# Patient Record
Sex: MALE | Race: WHITE | Employment: STUDENT | URBAN - METROPOLITAN AREA
[De-identification: names, ages, dates, MRNs, and addresses within clinical notes are randomized per-mention and may not be internally consistent; named-entity substitution may affect disease eponyms.]

---
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
## 2022-03-18 PROBLEM — R62.52 SHORT STATURE (CHILD): Status: ACTIVE | Noted: 2017-10-17

## 2022-03-19 PROBLEM — E23.0 GROWTH HORMONE DEFICIENCY (HCC): Status: ACTIVE | Noted: 2018-05-14

## 2022-03-19 PROBLEM — E34.31 CONSTITUTIONAL DELAY OF GROWTH AND DEVELOPMENT: Status: ACTIVE | Noted: 2017-10-17

## 2022-11-22 NOTE — LETTER
8/17/2018 4:07 PM 
 
Patient:  Yareli James YOB: 2000 Date of Visit: 8/17/2018 Dear Jerrold Simmonds, MD 
[address] [id number] VIA Facsimile: [phone] 
 : 
 
 
Thank you for referring Mr. Brenton Meier to me for evaluation/treatment. Below are the relevant portions of my assessment and plan of care. Chief Complaint Patient presents with  Follow-up Growth Subjective:  
F/U: Short stature History of present illness: 
Solo Mccray is a 14  y.o. 0  m.o. male who has been followed in endocrine clinic since 10/17/2017 for short stature. He was present today with his parents. Family have been concerned about his slow growth and lack of pubertal development for a number of years; since 7th grade. Initially they thought she would take after parents and brother who both started puberty late. However they became concerned when he hit 17years  Without much change in height or pubertal development. Bone age xray done by PMD on 9/13/17 at [place] of 17yrs was read as 15yrs 6months. Referred to [facility] for further evaluation.;  
Denies headache,tiredness, problems with peripheral vision,abdominal pain,constipation/diarrhea,heat/cold intolerance,polyuria,polydipsia. Labs done at in 10/2017 were significant for normal thyroid studies,normal growth hormone screening lans, normal CBC with H/H of 14.4/41.5,normal CMP, normal ESR. He also had LH consistent with onset of puberty. On account of slow growth velocity he had 2 agent GH stim test on 5/2/2018 with peak of 9.9ng/ml(failed). Also had a normal brain MRI on 5/29/2018. His last visit in endocrine clinic was on 6/6/2018. Since then, he has been in good health, with no significant illnesses. Started [facility] therapy in 6/2018. reports no headache, tiredness, constipation, hip pain, N/V. Past Medical History:  
Diagnosis Date  ADD (attention deficit disorder) 2015 Social History: Sofia Alvarado is in 11th grade. Activities: soccer Review of Systems: A comprehensive review of systems was negative except for that written in the HPI. Medications: 
Current Outpatient Prescriptions Medication Sig  somatropin (HUMATROPE INJECTION) 2.3 mg by Injection route daily.  montelukast (SINGULAIR) 10 mg tablet Take 10 mg by mouth as needed.  Omega-3 Fatty Acids 300 mg cap Take  by mouth.  albuterol (PROVENTIL, VENTOLIN) 90 mcg/actuation inhaler Take 2 Puffs by inhalation every six (6) hours as needed. No current facility-administered medications for this visit. Allergies: Allergies Allergen Reactions  Milk Cough Objective:  
 
 
Visit Vitals  /70 (BP 1 Location: Left arm, BP Patient Position: Sitting)  Pulse 86  Temp 97.6 °F (36.4 °C) (Oral)  Ht 5' 3.39\" (1.61 m)  Wt 120 lb 6.4 oz (54.6 kg)  SpO2 96%  BMI 21.07 kg/m2 Height: 5 %ile (Z= -1.61) based on CDC 2-20 Years stature-for-age data using vitals from 8/17/2018. Weight: 25 %ile (Z= -0.67) based on CDC 2-20 Years weight-for-age data using vitals from 8/17/2018. BMI: Body mass index is 21.07 kg/(m^2). Percentile: 57 %ile (Z= 0.19) based on CDC 2-20 Years BMI-for-age data using vitals from 8/17/2018. Change in height:+1.5cm in 2months     GV: 7.4cm/year Change in weight: relatively unchanged In general, Sofia Alvarado is alert, well-appearing and in no acute distress. HEENT: normocephalic, atraumatic. Pupils are equal, round and reactive to light. Extraocular movements are intact, fundi are sharp bilaterally. Dentition is appropriate for age. Oropharynx is clear, mucous membranes moist. Neck is supple without lymphadenopathy. Thyroid is smooth and not enlarged. Chest: Clear to auscultation bilaterally. CV: Normal S1/S2 without murmur. Abdomen is soft, nontender, nondistended, no hepatosplenomegaly. Skin is warm, without rash or macules.  Extremities are within normal. Neuro demonstrates 2+ patellar reflexes bilaterally. Sexual development: stage chidi 3 testes and PH Laboratory data: 
Results for orders placed or performed during the hospital encounter of 05/02/18 CORTISOL Result Value Ref Range Cortisol, random 6.8 ug/dL GROWTH HORMONE Result Value Ref Range Growth hormone 0.3 0.0 - 10.0 ng/mL GROWTH HORMONE Result Value Ref Range Growth hormone 9.9 0.0 - 10.0 ng/mL GROWTH HORMONE Result Value Ref Range Growth hormone 4.9 0.0 - 10.0 ng/mL GROWTH HORMONE Result Value Ref Range Growth hormone 1.5 0.0 - 10.0 ng/mL GROWTH HORMONE Result Value Ref Range Growth hormone 0.8 0.0 - 10.0 ng/mL GROWTH HORMONE Result Value Ref Range Growth hormone 8.1 0.0 - 10.0 ng/mL GROWTH HORMONE Result Value Ref Range Growth hormone 5.4 0.0 - 10.0 ng/mL Bone age: Date: 9/13/2017. AT [facility] of 17yrs 1mon bone age was 15yrs 6months Assessment:  
 
 
Joselin Nam is a 14  y.o. 0  m.o. male presenting for follow up of GHD. Started [facility] in 6/2018Good interval growth. Continue with current dose of humatrope 2.3mg daily(0.29mg/kg/week). Improve caloric intake to maximize height potentialFollow up in 4monhs. Plan:  
Reviewed growth charts with family Diagnosis, etiology, pathophysiology, risk/ benefits of rx, proposed eval, and expected follow up discussed with family and all questions answered Patient Instructions Seen for follow up for GHD Plan: 
Continue humatrope 2.3mg daily Reviewed the side effects of [facility] treatment including: pseudotumor cerebri (benign intracranial hypertension); SCFE; hypothyroidism. They will contact me for concerns over head ache or leg pain. Follow up in 4months or sooner if any concerns Total time: 30minutes Time spent counseling patient/family: 50% If you have questions, please do not hesitate to call me. I look forward to following Mr. Zion Courtney along with you. Sincerely, 
 
 
Bhargavi Siddiqui MD 
 
 Cosentyx Counseling:  I discussed with the patient the risks of Cosentyx including but not limited to worsening of Crohn's disease, immunosuppression, allergic reactions and infections.  The patient understands that monitoring is required including a PPD at baseline and must alert us or the primary physician if symptoms of infection or other concerning signs are noted.

## 2023-05-10 RX ORDER — ALBUTEROL SULFATE 90 UG/1
2 AEROSOL, METERED RESPIRATORY (INHALATION) EVERY 6 HOURS PRN
COMMUNITY

## 2023-05-10 RX ORDER — MONTELUKAST SODIUM 10 MG/1
TABLET ORAL PRN
COMMUNITY

## 2023-05-10 RX ORDER — DICYCLOMINE HYDROCHLORIDE 10 MG/1
CAPSULE ORAL
COMMUNITY
Start: 2020-12-29

## 2023-05-10 RX ORDER — PANTOPRAZOLE SODIUM 40 MG/1
1 TABLET, DELAYED RELEASE ORAL EVERY MORNING
COMMUNITY
Start: 2020-12-29

## 2025-01-07 NOTE — LETTER
4/16/2018 9:43 PM 
 
Patient:  Emeli Vargas YOB: 2000 Date of Visit: 4/16/2018 Dear Torres Porras MD 
[address] [id number] VIA Facsimile: [phone] 
 : 
 
 
Thank you for referring Mr. Cat Sung to me for evaluation/treatment. Below are the relevant portions of my assessment and plan of care. Subjective:  
F/U: Short stature History of present illness: 
Dahlia Bowser is a 15  y.o. 6  m.o. male who has been followed in endocrine clinic since 10/17/2017 for short stature. He was present today with his parents. Family have been concerned about his slow growth and lack of pubertal development for a number of years; since 7th grade. Initially they thought she would take after parents and brother who both started puberty late. However they became concerned when he hit 17years  Without much change in height or pubertal development. Bone age xray done by PMD on 9/13/17 at [place] of 17yrs was read as 15yrs 6months. Referred to [facility] for further evaluation.;  
Denies headache,tiredness, problems with peripheral vision,abdominal pain,constipation/diarrhea,heat/cold intolerance,polyuria,polydipsia. His last visit in endocrine clinic was on 10/17/2017. Since then, he has been in good health, with no significant illnesses. Labs done at last clinic visit were significant for normal thyroid studies,normal growth hormone screening lans, normal CBC with H/H of 14.4/41.5,normal CMP, normal ESR. He is here today for follow up. Past Medical History:  
Diagnosis Date  ADD (attention deficit disorder) 2015 Social History: 
Dahlia Bowser is in 10th grade. Activities: soccer Review of Systems: A comprehensive review of systems was negative except for that written in the HPI. Medications: 
Current Outpatient Prescriptions Medication Sig  
 montelukast (SINGULAIR) 10 mg tablet Take 10 mg by mouth as needed. Balloon inserted to right coronary artery.  Omega-3 Fatty Acids 300 mg cap Take  by mouth.  albuterol (PROVENTIL, VENTOLIN) 90 mcg/actuation inhaler Take 2 Puffs by inhalation every six (6) hours as needed.  fluticasone (FLOVENT HFA) 44 mcg/actuation inhaler Take 2 Puffs by inhalation two (2) times a day.  fexofenadine (CHILDREN'S ALLEGRA ALLERGY) 30 mg tablet Take 30 mg by mouth two (2) times a day. No current facility-administered medications for this visit. Allergies: Allergies Allergen Reactions  Milk Cough Objective:  
 
 
Visit Vitals  /69 (BP 1 Location: Left arm, BP Patient Position: Sitting)  Pulse 62  Temp 98.3 °F (36.8 °C) (Oral)  Resp 18  Ht 5' 2.4\" (1.585 m)  Wt 119 lb 6.4 oz (54.2 kg)  SpO2 99%  BMI 21.56 kg/m2 Height: 4 %ile (Z= -1.77) based on CDC 2-20 Years stature-for-age data using vitals from 4/16/2018. Weight: 29 %ile (Z= -0.56) based on CDC 2-20 Years weight-for-age data using vitals from 4/16/2018. BMI: Body mass index is 21.56 kg/(m^2). Percentile: 66 %ile (Z= 0.42) based on CDC 2-20 Years BMI-for-age data using vitals from 4/16/2018. Change in height:+3.3cm in 6months     GV: 6.6cm/year Change in weight: +1.9kg in 6months In general, Junior Baig is alert, well-appearing and in no acute distress. HEENT: normocephalic, atraumatic. Pupils are equal, round and reactive to light. Extraocular movements are intact, fundi are sharp bilaterally. Dentition is appropriate for age. Oropharynx is clear, mucous membranes moist. Neck is supple without lymphadenopathy. Thyroid is smooth and not enlarged. Chest: Clear to auscultation bilaterally. CV: Normal S1/S2 without murmur. Abdomen is soft, nontender, nondistended, no hepatosplenomegaly. Skin is warm, without rash or macules. Extremities are within normal. Neuro demonstrates 2+ patellar reflexes bilaterally. Sexual development: stage chidi 3 testes and PH Laboratory data: Results for orders placed or performed in visit on 10/17/17 CBC WITH AUTOMATED DIFF Result Value Ref Range WBC 8.6 3.4 - 10.8 x10E3/uL  
 RBC 4.82 4.14 - 5.80 x10E6/uL HGB 14.4 12.6 - 17.7 g/dL HCT 41.5 37.5 - 51.0 % MCV 86 79 - 97 fL  
 MCH 29.9 26.6 - 33.0 pg  
 MCHC 34.7 31.5 - 35.7 g/dL  
 RDW 12.5 12.3 - 15.4 % PLATELET 198 697 - 231 x10E3/uL NEUTROPHILS 69 Not Estab. % Lymphocytes 22 Not Estab. % MONOCYTES 7 Not Estab. % EOSINOPHILS 2 Not Estab. % BASOPHILS 0 Not Estab. %  
 ABS. NEUTROPHILS 5.9 1.4 - 7.0 x10E3/uL Abs Lymphocytes 1.9 0.7 - 3.1 x10E3/uL  
 ABS. MONOCYTES 0.6 0.1 - 0.9 x10E3/uL  
 ABS. EOSINOPHILS 0.1 0.0 - 0.4 x10E3/uL  
 ABS. BASOPHILS 0.0 0.0 - 0.3 x10E3/uL IMMATURE GRANULOCYTES 0 Not Estab. %  
 ABS. IMM. GRANS. 0.0 0.0 - 0.1 x10E3/uL METABOLIC PANEL, COMPREHENSIVE Result Value Ref Range Glucose 93 65 - 99 mg/dL BUN 10 5 - 18 mg/dL Creatinine 0.53 (L) 0.76 - 1.27 mg/dL GFR est non-AA CANCELED mL/min/1.73 GFR est AA CANCELED mL/min/1.73  
 BUN/Creatinine ratio 19 10 - 22 Sodium 137 134 - 144 mmol/L Potassium 5.2 3.5 - 5.2 mmol/L Chloride 95 (L) 96 - 106 mmol/L  
 CO2 27 18 - 29 mmol/L Calcium 10.2 8.9 - 10.4 mg/dL Protein, total 7.6 6.0 - 8.5 g/dL Albumin 5.0 3.5 - 5.5 g/dL GLOBULIN, TOTAL 2.6 1.5 - 4.5 g/dL A-G Ratio 1.9 1.2 - 2.2 Bilirubin, total 0.4 0.0 - 1.2 mg/dL Alk. phosphatase 294 (H) 84 - 254 IU/L  
 AST (SGOT) 20 0 - 40 IU/L  
 ALT (SGPT) 13 0 - 30 IU/L  
SED RATE (ESR) Result Value Ref Range Sed rate (ESR) 2 0 - 15 mm/hr IGF BINDING PROTEIN 3 Result Value Ref Range IGF-BP3 6013 ug/L INSULIN-LIKE GROWTH FACTOR 1 Result Value Ref Range Insulin-Like Growth Factor I 482 ng/mL T4, FREE Result Value Ref Range T4, Free 1.19 0.93 - 1.60 ng/dL TSH 3RD GENERATION Result Value Ref Range TSH 2.190 0.450 - 4.500 uIU/mL LUTEINIZING HORMONE, PEDIATRIC  
 Result Value Ref Range Luteinizing Hormone (LH) 1.9 mIU/mL Bone age: Date: 9/13/2017. AT [facility] of 17yrs 1mon bone age was 15yrs 6months Assessment:  
 
 
Aleshia Mishra is a 15  y.o. 6  m.o. male presenting for follow up of short stature. He has been in good health since his last visit, and exam today is significant for height at the 4th %ile and weight at the 29th%ile. He is chidi stage 3 for testes and PH. Aleshia Mishra grew by 3.3cm giving an annualized G V of 6.6cm/year which is slow for a male child in midpuberty. Screening labs done at last clinic visit came back normal ruling out thyroid dx, anemia, chronic inflammatory inflammatory. Normal BMI makes celiac dx less likely. He also had normal screening labs for growth hormones. Normal growth hormone screening levels makes GHD less likely. Bone age at the low end of normal. Thus though his growth pattern might be consistent with constitutional delay of growth and development, his slow interval growth velocity for his stage of puberty(mid puberty) is concerning and I would like to proceed with GH stim test. I reviewed the details of the test and the expectations. Would also obtain a testosterone level as part of [address] stim test. Plan discussed with family who verbalized understanding. Plan:  
Reviewed growth charts and labs from last clinic visit with family Diagnosis, etiology, pathophysiology, risk/ benefits of rx, proposed eval, and expected follow up discussed with family and all questions answered As above. Patient Instructions Seen for follow up for short stature Plan: 
Would pursue [address] stim test 
Would call family with results and further management plan Follow up in 4months or sooner if any concerns Total time: 30minutes Time spent counseling patient/family: 50% If you have questions, please do not hesitate to call me. I look forward to following Mr. Jesusita Umana along with you.  
 
 
 
Sincerely, 
 
 
Andrew Early MD